# Patient Record
Sex: MALE | Race: WHITE
[De-identification: names, ages, dates, MRNs, and addresses within clinical notes are randomized per-mention and may not be internally consistent; named-entity substitution may affect disease eponyms.]

---

## 2017-05-19 NOTE — ER
Date of Service:  05/18/2017

 

SUBJECTIVE:  Conner presents to the emergency room with complaints of pain to

his right foot.  The patient states that he misstepped coming off his stairs at

home and inwardly rotated his right foot.  He states that he did not strike his

foot on anything.  He states that his discomfort is located in the dorsal aspect

of his right foot in the proximal mid metatarsal.  He states that he is not

experiencing any paresthesia to the distal portion of the injury.

 

PAST MEDICAL HISTORY:  Denies.

 

MEDICATIONS:  None.

 

ALLERGIES:  NKDA.

 

REVIEW OF SYSTEMS:

General:  Denies any numbness or tingling distal to the area of injury.

 

PHYSICAL EXAMINATION:

General:  This is a 36-year-old male patient, who is in no acute distress.

Vital Signs:  Blood pressure is 156/114, pulse rate is 120, temperature is 37.2,

respiratory rate 18, and O2 saturations 96%.

Skin:  Warm, pink, and dry.

Musculoskeletal:  He does have some edema and ecchymosis to the dorsal aspect of

his right foot.  No obvious step-offs or deformity noted.  No crepitus noted.

On neurovascular, circulation, sensation, and motor function all within normal

limits in distal portion the extremity.

 

RADIOGRAPHIC DATA:  Three-view right foot series was obtained.  There was no

evidence of any acute fracture or dislocation.

 

ASSESSMENT:  Right foot sprain.

 

PLAN:  The patient was placed in a postop shoe and started on crutches.  Tylenol

and ibuprofen for discomfort.  I also gave him a prescription for approximately

12 tramadol with instructions to take one every 4-6 hours as needed for pain.  I

would like him to follow up in the clinic in the next 7-10 days for repeat

radiographs if it is continuing to hurt.  Ice the area for 10 to 15 minutes

every 1 to 2 hours to decrease swelling.  All questions were answered.

 

 

MWK:  05/18/2017 22:17:35  MODL:  05/18/2017 23:44:31

Job #:  [id number]

## 2018-07-07 ENCOUNTER — HOSPITAL ENCOUNTER (EMERGENCY)
Dept: HOSPITAL 50 - VM.ED | Age: 38
Discharge: HOME | End: 2018-07-07
Payer: SELF-PAY

## 2018-07-07 VITALS — DIASTOLIC BLOOD PRESSURE: 78 MMHG | SYSTOLIC BLOOD PRESSURE: 130 MMHG

## 2018-07-07 DIAGNOSIS — F17.210: ICD-10-CM

## 2018-07-07 DIAGNOSIS — J18.9: Primary | ICD-10-CM

## 2018-07-07 LAB
ANION GAP SERPL CALC-SCNC: 17.6 MMOL/L (ref 10–20)
CHLORIDE SERPL-SCNC: 96 MMOL/L (ref 98–107)
SODIUM SERPL-SCNC: 131 MMOL/L (ref 136–145)

## 2018-07-07 PROCEDURE — 85025 COMPLETE CBC W/AUTO DIFF WBC: CPT

## 2018-07-07 PROCEDURE — 96374 THER/PROPH/DIAG INJ IV PUSH: CPT

## 2018-07-07 PROCEDURE — 86140 C-REACTIVE PROTEIN: CPT

## 2018-07-07 PROCEDURE — 87804 INFLUENZA ASSAY W/OPTIC: CPT

## 2018-07-07 PROCEDURE — 87040 BLOOD CULTURE FOR BACTERIA: CPT

## 2018-07-07 PROCEDURE — 84100 ASSAY OF PHOSPHORUS: CPT

## 2018-07-07 PROCEDURE — 96361 HYDRATE IV INFUSION ADD-ON: CPT

## 2018-07-07 PROCEDURE — 83735 ASSAY OF MAGNESIUM: CPT

## 2018-07-07 PROCEDURE — 81001 URINALYSIS AUTO W/SCOPE: CPT

## 2018-07-07 PROCEDURE — 70450 CT HEAD/BRAIN W/O DYE: CPT

## 2018-07-07 PROCEDURE — 80053 COMPREHEN METABOLIC PANEL: CPT

## 2018-07-07 PROCEDURE — 85610 PROTHROMBIN TIME: CPT

## 2018-07-07 PROCEDURE — 83605 ASSAY OF LACTIC ACID: CPT

## 2018-07-07 PROCEDURE — 36415 COLL VENOUS BLD VENIPUNCTURE: CPT

## 2018-07-07 PROCEDURE — 87880 STREP A ASSAY W/OPTIC: CPT

## 2018-07-07 PROCEDURE — 71046 X-RAY EXAM CHEST 2 VIEWS: CPT

## 2018-07-07 PROCEDURE — 87081 CULTURE SCREEN ONLY: CPT

## 2018-07-07 PROCEDURE — 99284 EMERGENCY DEPT VISIT MOD MDM: CPT

## 2018-07-07 NOTE — EDM.PDOC
ED HPI GENERAL MEDICAL PROBLEM





- General


Chief Complaint: Fever


Time Seen by Provider: 07/07/18 16:10


Source of Information: Reports: Patient





- History of Present Illness


INITIAL COMMENTS - FREE TEXT/NARRATIVE: 





Pt. states that he has been experiencing fever, sweats, and headache for 

several days. He denies any neck pain. No photophobia. Denies any truma to the 

area. No chest pain, shortness of breath, cough or weakness. Denies any rashes. 

No dysuria or flank pain. No recent nausea, vomiting, or diarrhea.


He denies any sick contacts. He has not travelled recently.


  ** Headache


Pain Score (Numeric/FACES): 5





- Related Data


 Allergies











Allergy/AdvReac Type Severity Reaction Status Date / Time


 


No Known Allergies Allergy   Verified 07/07/18 16:27











Home Meds: 


 Home Meds





. [No Known Home Meds]  07/07/18 [History]











Past Medical History





- Past Health History


Medical/Surgical History: Denies Medical/Surgical History





Social & Family History





- Tobacco Use


Smoking Status *Q: Current Every Day Smoker


Years of Tobacco use: 15


Packs/Tins Daily: 0.5





- Living Situation & Occupation


Living situation: Reports: Single





ED ROS GENERAL





- Review of Systems


Review Of Systems: See Below


Constitutional: Reports: Fever, Chills, Malaise, Fatigue, Night Sweats, 

Diaphoresis


HEENT: Reports: No Symptoms


Respiratory: Reports: No Symptoms


Cardiovascular: Reports: No Symptoms


Endocrine: Reports: No Symptoms


GI/Abdominal: Reports: No Symptoms


: Reports: No Symptoms


Musculoskeletal: Reports: No Symptoms


Skin: Reports: No Symptoms, Diaphoresis


Neurological: Reports: No Symptoms


Psychiatric: Reports: No Symptoms


Hematologic/Lymphatic: Reports: No Symptoms


Immunologic: Reports: No Symptoms





ED EXAM, GENERAL





- Physical Exam


Exam: See Below


Exam Limited By: No Limitations


General Appearance: Alert, WD/WN, No Apparent Distress


Ears: Normal External Exam, Normal Canal, Hearing Grossly Normal, Normal TMs


Ear Exam: Bilateral Ear: Auricle Normal, Canal Normal, TM normal


Nose: Normal Inspection, Normal Mucosa, No Blood


Throat/Mouth: Normal Inspection, Normal Lips, Normal Teeth, Normal Gums, Normal 

Oropharynx, Normal Voice, No Airway Compromise


Head: Atraumatic, Normocephalic


Neck: Normal Inspection, Supple, Non-Tender, Full Range of Motion


Respiratory/Chest: No Respiratory Distress, Lungs Clear, Normal Breath Sounds, 

No Accessory Muscle Use, Chest Non-Tender


Cardiovascular: Normal Peripheral Pulses, Regular Rate, Rhythm, No Edema, No 

Gallop, No JVD, No Murmur, No Rub


Peripheral Pulses: 4+: Radial (L), Radial (R)


GI/Abdominal: Normal Bowel Sounds, Soft, Non-Tender, No Organomegaly, No 

Distention, No Abnormal Bruit, No Mass


 (Male) Exam: Deferred


Rectal (Males) Exam: Deferred


Back Exam: Normal Inspection, Full Range of Motion, NT


Extremities: Normal Inspection, Normal Range of Motion, Non-Tender, Normal 

Capillary Refill, No Pedal Edema


Neurological: Alert, Oriented, CN II-XII Intact, Normal Cognition, Normal Gait, 

Normal Reflexes, No Motor/Sensory Deficits


Psychiatric: Normal Affect, Normal Mood


Skin Exam: Diaphoretic, Increased Warmth


Lymphatic: No Adenopathy





Course





- Vital Signs


Last Recorded V/S: 





 Last Vital Signs











Temp  38.0 C   07/07/18 16:10


 


Pulse  97   07/07/18 16:10


 


Resp  16   07/07/18 16:10


 


BP  136/78   07/07/18 16:10


 


Pulse Ox  95   07/07/18 16:10














- Orders/Labs/Meds


Orders: 





 Active Orders 24 hr











 Category Date Time Status


 


 Chest 2V [CR] Stat Exams  07/07/18 16:31 Taken


 


 Head wo Cont [CT] Stat Exams  07/07/18 16:31 Taken


 


 CULTURE BLOOD [BC] Stat Lab  07/07/18 16:38 Received


 


 CULTURE BLOOD [BC] Stat Lab  07/07/18 16:44 Received


 


 CULTURE STREP A CONFIRMATION [] Stat Lab  07/07/18 16:32 Results


 


 INFLUENZA A+B AG SCREEN [] Stat Lab  07/07/18 16:32 Ordered


 


 STREP SCRN A RAPID W CULT CONF [] Stat Lab  07/07/18 16:32 Ordered


 


 Lactated Ringers [Ringers, Lactated] 1,000 ml Med  07/07/18 16:45 Active





 IV ASDIRECTED   


 


 Sodium Chloride 0.9% [Saline Flush] Med  07/07/18 16:31 Active





 10 ml FLUSH ASDIRECTED PRN   


 


 Blood Culture x2 Reflex Set [OM.PC] Stat Oth  07/07/18 16:31 Ordered


 


 Peripheral IV Insertion Adult [OM.PC] Routine Oth  07/07/18 16:31 Ordered








 Medication Orders





Lactated Ringer's (Ringers, Lactated)  1,000 mls @ 500 mls/hr IV ASDIRECTED MARCO A


   Last Admin: 07/07/18 16:38  Dose: 500 mls/hr


Sodium Chloride (Saline Flush)  10 ml FLUSH ASDIRECTED PRN


   PRN Reason: Keep Vein Open








Labs: 





 Laboratory Tests











  07/07/18 07/07/18 07/07/18 Range/Units





  16:31 16:38 16:38 


 


WBC   5.6   (4.0-10.0)  x10^3/uL


 


RBC   4.66   (4.5-6.0)  x10^6/uL


 


Hgb   14.4   (14.0-18.0)  g/dL


 


Hct   40.8   (40.0-52.0)  %


 


MCV   87.6   (78.0-93.0)  fL


 


MCH   30.9   (26.0-32.0)  pg


 


MCHC   35.3   (32.0-36.0)  g/dL


 


RDW Coeff of Ned   12.5   (10.0-15.0)  %


 


Plt Count   140   (130-400)  x10^3/uL


 


Neut % (Auto)   57.0   (50.0-80.0)  %


 


Lymph % (Auto)   23.0 L   (25.0-50.0)  %


 


Mono % (Auto)   18.9 H   (2.0-11.0)  %


 


Eos % (Auto)   0.7   (0.0-4.0)  %


 


Baso % (Auto)   0.4   (0.2-1.2)  %


 


PT    10.5  (9.6-11.4)  SEC


 


INR    1.0 L  (2.0-3.5)  


 


Sodium     (136-145)  mmol/L


 


Potassium     (3.5-5.1)  mmol/L


 


Chloride     ()  mmol/L


 


Carbon Dioxide     (21-32)  mmol/L


 


Anion Gap     (10-20)  mmol/L


 


BUN     (7-18)  mg/dL


 


Creatinine     (0.70-1.30)  mg/dL


 


Est Cr Clr Drug Dosing     mL/min


 


Estimated GFR (MDRD)     


 


Glucose     ()  mg/dL


 


Lactic Acid     (0.4-2.0)  mmol/L


 


Calcium     (8.5-10.1)  mg/dL


 


Corrected Calcium     (8.5-10.1)  mg/dL


 


Phosphorus     (2.6-4.7)  mg/dL


 


Magnesium     (1.8-2.4)  mg/dL


 


Total Bilirubin     (0.2-1.0)  mg/dL


 


AST     (15-37)  U/L


 


ALT     (16-63)  U/L


 


Alkaline Phosphatase     ()  U/L


 


C-Reactive Protein     (<=0.9)  mg/dL


 


Total Protein     (6.4-8.2)  g/dL


 


Albumin     (3.4-5.0)  g/dL


 


Globulin     


 


Albumin/Globulin Ratio     


 


Urine Color  Yellow    (YELLOW)  


 


Urine Appearance  Clear    (CLEAR)  


 


Urine pH  7.5    (5.0-8.0)  


 


Ur Specific Gravity  1.015    


 


Urine Protein  30 H    (NEGATIVE)  mg/dL


 


Urine Glucose (UA)  Negative    (NEGATIVE)  mg/dL


 


Urine Ketones  Trace H    (NEGATIVE)  mg/dL


 


Urine Occult Blood  Negative    (NEGATIVE)  


 


Urine Nitrite  Negative    (NEGATIVE)  


 


Urine Bilirubin  Small H    (NEGATIVE)  


 


Urine Urobilinogen  4.0 H    (0.2)  EU/dL


 


Ur Leukocyte Esterase  Negative    (NEGATIVE)  


 


Urine RBC  0-5    (NOT SEEN)  /HPF


 


Urine WBC  0-5    (NOT SEEN)  /HPF


 


Ur Squamous Epith Cells  Not seen    (NEGATIVE)  /HPF


 


Urine Bacteria  Few H    (NEGATIVE)  /HPF


 


Urine Mucus  Moderate H    (NEGATIVE)  /LPF














  07/07/18 07/07/18 Range/Units





  16:38 16:38 


 


WBC    (4.0-10.0)  x10^3/uL


 


RBC    (4.5-6.0)  x10^6/uL


 


Hgb    (14.0-18.0)  g/dL


 


Hct    (40.0-52.0)  %


 


MCV    (78.0-93.0)  fL


 


MCH    (26.0-32.0)  pg


 


MCHC    (32.0-36.0)  g/dL


 


RDW Coeff of Ned    (10.0-15.0)  %


 


Plt Count    (130-400)  x10^3/uL


 


Neut % (Auto)    (50.0-80.0)  %


 


Lymph % (Auto)    (25.0-50.0)  %


 


Mono % (Auto)    (2.0-11.0)  %


 


Eos % (Auto)    (0.0-4.0)  %


 


Baso % (Auto)    (0.2-1.2)  %


 


PT    (9.6-11.4)  SEC


 


INR    (2.0-3.5)  


 


Sodium  131 L   (136-145)  mmol/L


 


Potassium  3.6   (3.5-5.1)  mmol/L


 


Chloride  96 L   ()  mmol/L


 


Carbon Dioxide  21   (21-32)  mmol/L


 


Anion Gap  17.6   (10-20)  mmol/L


 


BUN  18   (7-18)  mg/dL


 


Creatinine  0.8   (0.70-1.30)  mg/dL


 


Est Cr Clr Drug Dosing  124.69   mL/min


 


Estimated GFR (MDRD)  > 60   


 


Glucose  96   ()  mg/dL


 


Lactic Acid   0.6  (0.4-2.0)  mmol/L


 


Calcium  8.8   (8.5-10.1)  mg/dL


 


Corrected Calcium  9.44   (8.5-10.1)  mg/dL


 


Phosphorus  2.9   (2.6-4.7)  mg/dL


 


Magnesium  1.9   (1.8-2.4)  mg/dL


 


Total Bilirubin  0.9   (0.2-1.0)  mg/dL


 


AST  39 H   (15-37)  U/L


 


ALT  48   (16-63)  U/L


 


Alkaline Phosphatase  88   ()  U/L


 


C-Reactive Protein  31.2 H   (<=0.9)  mg/dL


 


Total Protein  7.4   (6.4-8.2)  g/dL


 


Albumin  3.2 L   (3.4-5.0)  g/dL


 


Globulin  4.2   


 


Albumin/Globulin Ratio  0.76   


 


Urine Color    (YELLOW)  


 


Urine Appearance    (CLEAR)  


 


Urine pH    (5.0-8.0)  


 


Ur Specific Gravity    


 


Urine Protein    (NEGATIVE)  mg/dL


 


Urine Glucose (UA)    (NEGATIVE)  mg/dL


 


Urine Ketones    (NEGATIVE)  mg/dL


 


Urine Occult Blood    (NEGATIVE)  


 


Urine Nitrite    (NEGATIVE)  


 


Urine Bilirubin    (NEGATIVE)  


 


Urine Urobilinogen    (0.2)  EU/dL


 


Ur Leukocyte Esterase    (NEGATIVE)  


 


Urine RBC    (NOT SEEN)  /HPF


 


Urine WBC    (NOT SEEN)  /HPF


 


Ur Squamous Epith Cells    (NEGATIVE)  /HPF


 


Urine Bacteria    (NEGATIVE)  /HPF


 


Urine Mucus    (NEGATIVE)  /LPF











Meds: 





Medications











Generic Name Dose Route Start Last Admin





  Trade Name Freq  PRN Reason Stop Dose Admin


 


Lactated Ringer's  1,000 mls @ 500 mls/hr  07/07/18 16:45  07/07/18 16:38





  Ringers, Lactated  IV   500 mls/hr





  ASDIRECTED MARCO A   Administration





     





     





     





     


 


Sodium Chloride  10 ml  07/07/18 16:31  





  Saline Flush  FLUSH   





  ASDIRECTED PRN   





  Keep Vein Open   





     





     





     














Discontinued Medications














Generic Name Dose Route Start Last Admin





  Trade Name Ronnie  PRN Reason Stop Dose Admin


 


Ceftriaxone Sodium  2 gm  07/07/18 17:37  07/07/18 17:46





  Rocephin  IVPUSH  07/07/18 17:38  2 gm





  STAT ONE   Administration





     





     





     





     


 


Doxycycline Monohydrate  1 packet  07/07/18 17:47  





  Take Home: Doxycycline 100 Mg, 4 Tab Pack  PO  07/07/18 17:48  





  ONETIME ONE   





     





     





     





     














- Radiology Interpretation


Free Text/Narrative:: 





CT brain is negative. Chest x-ray reveals R middle lobe infiltrate.





Departure





- Departure


Time of Disposition: 18:00


Disposition: Home, Self-Care 01


Clinical Impression: 


 Community acquired pneumonia








- Discharge Information


Instructions:  Community-Acquired Pneumonia, Adult, Easy-to-Read


Referrals: 


PCP,None [Primary Care Provider] - 


Forms:  ED Department Discharge


Additional Instructions: 


Doxycycline 100mg twice daily for 10 days





Drink plenty of fluids





Return to ER if shortness of breath, chest pain, or severe weakness.





- My Orders


Last 24 Hours: 





My Active Orders





07/07/18 16:31


Chest 2V [CR] Stat 


Head wo Cont [CT] Stat 


Sodium Chloride 0.9% [Saline Flush]   10 ml FLUSH ASDIRECTED PRN 


Blood Culture x2 Reflex Set [OM.PC] Stat 


Peripheral IV Insertion Adult [OM.PC] Routine 





07/07/18 16:32


CULTURE STREP A CONFIRMATION [RM] Stat 


INFLUENZA A+B AG SCREEN [RM] Stat 


STREP SCRN A RAPID W CULT CONF [RM] Stat 





07/07/18 16:38


CULTURE BLOOD [BC] Stat 





07/07/18 16:44


CULTURE BLOOD [BC] Stat 





07/07/18 16:45


Lactated Ringers [Ringers, Lactated] 1,000 ml IV ASDIRECTED 














- Assessment/Plan


Last 24 Hours: 





My Active Orders





07/07/18 16:31


Chest 2V [CR] Stat 


Head wo Cont [CT] Stat 


Sodium Chloride 0.9% [Saline Flush]   10 ml FLUSH ASDIRECTED PRN 


Blood Culture x2 Reflex Set [OM.PC] Stat 


Peripheral IV Insertion Adult [OM.PC] Routine 





07/07/18 16:32


CULTURE STREP A CONFIRMATION [] Stat 


INFLUENZA A+B AG SCREEN [RM] Stat 


STREP SCRN A RAPID W CULT CONF [] Stat 





07/07/18 16:38


CULTURE BLOOD [BC] Stat 





07/07/18 16:44


CULTURE BLOOD [BC] Stat 





07/07/18 16:45


Lactated Ringers [Ringers, Lactated] 1,000 ml IV ASDIRECTED 











Plan: 





Doxycycline 100mg twice daily for 10 days





Drink plenty of fluids





Return to ER if shortness of breath, chest pain, or severe weakness.

## 2019-11-25 NOTE — EDM.PDOC
ED HPI GENERAL MEDICAL PROBLEM





- General


Chief Complaint: Headache


Time Seen by Provider: 11/25/19 09:29





- History of Present Illness


INITIAL COMMENTS - FREE TEXT/NARRATIVE: 





Pt presents c/o cough / ha / fever started this weekend and has not improved. 


  ** Occipital Headache


Pain Score (Numeric/FACES): 6





- Related Data


 Allergies











Allergy/AdvReac Type Severity Reaction Status Date / Time


 


No Known Allergies Allergy   Verified 11/25/19 09:24











Home Meds: 


 Home Meds





. [No Known Home Meds]  07/07/18 [History]











Past Medical History





- Past Health History


Medical/Surgical History: Denies Medical/Surgical History





Social & Family History





- Tobacco Use


Smoking Status *Q: Current Every Day Smoker


Years of Tobacco use: 18


Packs/Tins Daily: 0.5





- Living Situation & Occupation


Living situation: Reports: Single





ED ROS GENERAL





- Review of Systems


Review Of Systems: See Below


Constitutional: Reports: Fever


HEENT: Reports: No Symptoms


Respiratory: Reports: Cough


Cardiovascular: Reports: No Symptoms


Endocrine: Reports: No Symptoms


GI/Abdominal: Reports: No Symptoms


: Reports: No Symptoms


Musculoskeletal: Reports: No Symptoms


Skin: Reports: No Symptoms


Neurological: Reports: Headache


Psychiatric: Reports: No Symptoms


Hematologic/Lymphatic: Reports: No Symptoms


Immunologic: Reports: No Symptoms





ED EXAM, GENERAL





- Physical Exam


Exam: See Below


Free Text/Narrative:: 





labs wnl no inflitrates / pneumonia noted on chest x ray. awaiting for 

radiologist read. 


Exam Limited By: Altered Mental Status


General Appearance: Alert, WD/WN, No Apparent Distress


Eye Exam: Bilateral Eye: PERRL


Ears: Normal External Exam


Nose: Normal Inspection


Throat/Mouth: Normal Inspection


Head: Atraumatic, Normocephalic


Neck: Normal Inspection, Supple, Non-Tender, Full Range of Motion


Respiratory/Chest: No Respiratory Distress, Lungs Clear, Normal Breath Sounds, 

No Accessory Muscle Use, Chest Non-Tender


Cardiovascular: Normal Peripheral Pulses


Extremities: Normal Inspection, Normal Range of Motion, Non-Tender, No Pedal 

Edema, Normal Capillary Refill


Neurological: Alert, Oriented


Psychiatric: Normal Affect, Normal Mood


Skin Exam: Warm, Dry, Intact, Normal Color, No Rash





Course





- Vital Signs


Last Recorded V/S: 


 Last Vital Signs











Temp  37.6 C   11/25/19 09:15


 


Pulse  80   11/25/19 09:15


 


Resp  16   11/25/19 09:15


 


BP  152/97 H  11/25/19 09:15


 


Pulse Ox  97   11/25/19 09:15














- Orders/Labs/Meds


Orders: 


 Active Orders 24 hr











 Category Date Time Status


 


 Chest 2V [CR] Stat Exams  11/25/19 09:28 Taken











Labs: 


 Laboratory Tests











  11/25/19 11/25/19 Range/Units





  09:36 09:36 


 


WBC  4.1   (4.0-10.0)  x10^3/uL


 


RBC  5.57   (4.5-6.0)  x10^6/uL


 


Hgb  17.3  D   (14.0-18.0)  g/dL


 


Hct  49.4   (40.0-52.0)  %


 


MCV  88.7   (78.0-93.0)  fL


 


MCH  31.1   (26.0-32.0)  pg


 


MCHC  35.0   (32.0-36.0)  g/dL


 


RDW Coeff of Ned  12.6   (10.0-15.0)  %


 


Plt Count  126 L   (130-400)  x10^3/uL


 


Neut % (Auto)  44.3 L   (50.0-80.0)  %


 


Lymph % (Auto)  30.2   (25.0-50.0)  %


 


Mono % (Auto)  25.1 H   (2.0-11.0)  %


 


Eos % (Auto)  0.2   (0.0-4.0)  %


 


Baso % (Auto)  0.2   (0.2-1.2)  %


 


Sodium   139  (136-145)  mmol/L


 


Potassium   4.6  (3.5-5.1)  mmol/L


 


Chloride   101  ()  mmol/L


 


Carbon Dioxide   25  (21-32)  mmol/L


 


Anion Gap   17.6  (10-20)  mmol/L


 


BUN   14  (7-18)  mg/dL


 


Creatinine   0.9  (0.70-1.30)  mg/dL


 


Est Cr Clr Drug Dosing   TNP  


 


Estimated GFR (MDRD)   > 60  


 


Glucose   98  ()  mg/dL


 


Calcium   9.1  (8.5-10.1)  mg/dL














Departure





- Departure


Time of Disposition: 10:22


Disposition: Home, Self-Care 01


Condition: Good


Clinical Impression: 


 Viral URI with cough








- Discharge Information


Instructions:  Viral Respiratory Infection


Forms:  ED Department Discharge





- My Orders


Last 24 Hours: 


My Active Orders





11/25/19 09:28


Chest 2V [CR] Stat 














- Assessment/Plan


Last 24 Hours: 


My Active Orders





11/25/19 09:28


Chest 2V [CR] Stat

## 2019-11-25 NOTE — CR
______________________________________________________________________________   

  

[id number] RAD/RAD Chest PA And Lateral  

EXAM:  RAD Chest PA And Lateral  

   

 INDICATION:  COUGH, FEVER.  

   

 COMPARISON:  July 7, 2018.  

   

 DISCUSSION:    

   

 Cardiomediastinal silhouette is normal in size and contour.  

   

 No infiltrate, effusion, pneumothorax, or edema.  

   

 IMPRESSION:  

 No acute cardiopulmonary abnormality.  

   

 Electronically signed by Salvador Guo MD on 11/25/2019 10:26 AM  

   

  

Salvador Guo DO                 

 11/25/19 1029    

  

Thank you for allowing us to participate in the care of your patient.